# Patient Record
Sex: MALE | Race: WHITE | Employment: FULL TIME | URBAN - METROPOLITAN AREA
[De-identification: names, ages, dates, MRNs, and addresses within clinical notes are randomized per-mention and may not be internally consistent; named-entity substitution may affect disease eponyms.]

---

## 2018-11-16 ENCOUNTER — OFFICE VISIT (OUTPATIENT)
Dept: SURGERY | Facility: CLINIC | Age: 28
End: 2018-11-16
Payer: COMMERCIAL

## 2018-11-16 VITALS — WEIGHT: 196.81 LBS | BODY MASS INDEX: 27.55 KG/M2 | HEIGHT: 71 IN

## 2018-11-16 DIAGNOSIS — L72.11 PILAR AND TRICHILEMMAL CYSTS: Primary | ICD-10-CM

## 2018-11-16 DIAGNOSIS — L72.12 PILAR AND TRICHILEMMAL CYSTS: Primary | ICD-10-CM

## 2018-11-16 PROCEDURE — 99214 OFFICE O/P EST MOD 30 MIN: CPT | Performed by: SURGERY

## 2018-11-16 NOTE — CONSULTS
New Patient Consultation    Chief Complaint:  Patient presents with: Follow - Up: cyst(s) on scalp      History of Present Illness:   Tera Fountain is a 29year old male with several sebaceous cysts on his scalp and face.  They do bother him and cause occasio fever, chills, night sweats, fatigue, generalized weakness, change in appetite, weight loss, or weight gain. Endocrine:    There is no history of poor/slow wound healing, weight loss/gain, fertility or hormone problems, cold intolerance, heat intolerance, problems, trembling/tremors, fainting/black outs, dizziness, memory problems, loss of sensation/numbness, problems walking, weakness, tingling or burning in hands/feet.    Psychiatric:  There is no history of abusive relationship, bipolar disorder, sleep di minutes were spent with the patient, from which 35 minutes were spent counseling the patient and coordinating care. The different treatment options were discussed with the patient. The procedures and the postoperative care was discussed in detail.   P

## 2018-11-16 NOTE — PATIENT INSTRUCTIONS
Surgeon: Dr. Juan Lau.  Xavier Mariscal, PhD     Tel:  [phone]    Fax: [phone]     Surgery/Procedure: Excision of scalp sebaceous cysts x 7, excision of left eyebrow sebaceous cyst, complex wound closure, 1.5 hours, local anesthesia       Hospital:  [facility]

## 2018-11-16 NOTE — PROGRESS NOTES
Surgery and wash instructions verbally reviewed with the patient and written instructions were also provided. Informed consent for the procedure was reviewed and signed by the patient in the office, all questions were answered.  The patient was provided

## 2018-11-28 ENCOUNTER — TELEPHONE (OUTPATIENT)
Dept: SURGERY | Facility: CLINIC | Age: 28
End: 2018-11-28

## 2018-11-28 NOTE — TELEPHONE ENCOUNTER
S/w pt to schedule surgery. Scheduling procedure at [facility] on 1/22/19. Patient would like a call back from Dr Jarrod Ortega to discuss \"technique used during surgery. \" Offered to transfer to RN to address questions.  Patient refused and asked to talk directly wit

## 2018-11-29 NOTE — TELEPHONE ENCOUNTER
Spoke to pt and discussed using a 1mm punch biopsy for the eyebrow lesion to minimize scarring. Pt agrees and questions were answered.

## 2018-12-21 ENCOUNTER — TELEPHONE (OUTPATIENT)
Dept: SURGERY | Facility: CLINIC | Age: 28
End: 2018-12-21

## 2018-12-21 NOTE — TELEPHONE ENCOUNTER
Dr Raisa Mera is no longer available on 1/22 for surgery. She will be doing a joint procedure with another doctor. Offered to reschedule to 1/29. Patient is ok with this.

## 2019-01-29 ENCOUNTER — HOSPITAL ENCOUNTER (OUTPATIENT)
Facility: HOSPITAL | Age: 29
Setting detail: HOSPITAL OUTPATIENT SURGERY
Discharge: HOME | End: 2019-01-29
Attending: SURGERY | Admitting: SURGERY
Payer: COMMERCIAL

## 2019-01-29 VITALS
RESPIRATION RATE: 18 BRPM | DIASTOLIC BLOOD PRESSURE: 74 MMHG | SYSTOLIC BLOOD PRESSURE: 120 MMHG | TEMPERATURE: 98 F | BODY MASS INDEX: 26.18 KG/M2 | HEART RATE: 64 BPM | OXYGEN SATURATION: 100 % | HEIGHT: 71 IN | WEIGHT: 187 LBS

## 2019-01-29 DIAGNOSIS — L72.12 PILAR AND TRICHILEMMAL CYSTS: ICD-10-CM

## 2019-01-29 DIAGNOSIS — L72.11 PILAR AND TRICHILEMMAL CYSTS: ICD-10-CM

## 2019-01-29 PROCEDURE — 88304 TISSUE EXAM BY PATHOLOGIST: CPT | Performed by: SURGERY

## 2019-01-29 PROCEDURE — 0JB00ZZ EXCISION OF SCALP SUBCUTANEOUS TISSUE AND FASCIA, OPEN APPROACH: ICD-10-PCS | Performed by: SURGERY

## 2019-01-29 RX ORDER — LIDOCAINE HYDROCHLORIDE AND EPINEPHRINE 10; 10 MG/ML; UG/ML
INJECTION, SOLUTION INFILTRATION; PERINEURAL AS NEEDED
Status: DISCONTINUED | OUTPATIENT
Start: 2019-01-29 | End: 2019-01-29 | Stop reason: HOSPADM

## 2019-01-29 NOTE — BRIEF OP NOTE
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

## 2019-01-31 NOTE — OPERATIVE REPORT
[address]    PATIENT'S NAME: MARIANNA CARRILLO   ATTENDING PHYSICIAN: Kiersten Allen MD   OPERATING PHYSICIAN: Kiersten Allen MD   PATIENT ACCOUNT#:   [id number]    LOCATION:  [address] 3 EDW 10  MEDICAL RECORD #:   [id number]       DATE OF BIRT locations. All 6 cysts were sent together as they appeared clinically identical.  Hemostasis was achieved and all 6 incisions were closed with complex layered closure with 4-0 deep Vicryl sutures and 4-0 Prolene skin sutures.   Bacitracin ointment was appl

## 2019-02-08 ENCOUNTER — OFFICE VISIT (OUTPATIENT)
Dept: SURGERY | Facility: CLINIC | Age: 29
End: 2019-02-08
Payer: COMMERCIAL

## 2019-02-08 DIAGNOSIS — L72.12 PILAR AND TRICHILEMMAL CYSTS: Primary | ICD-10-CM

## 2019-02-08 DIAGNOSIS — L72.11 PILAR AND TRICHILEMMAL CYSTS: Primary | ICD-10-CM

## 2019-02-08 PROCEDURE — 99024 POSTOP FOLLOW-UP VISIT: CPT | Performed by: SURGERY

## 2019-02-08 NOTE — PROGRESS NOTES
Anish Pepper is a 29year old male who presents today for a follow-up after scalp pilar cyst excision x 6  He denies fever and chills. He denies nausea, vomiting, diarrhea or constipation. His pain is controlled.         Physical Exam     Surgical incisions

## 2021-02-22 ENCOUNTER — TELEPHONE (OUTPATIENT)
Dept: SURGERY | Facility: CLINIC | Age: 31
End: 2021-02-22

## 2021-02-22 NOTE — TELEPHONE ENCOUNTER
Called pt to let him know that his insurance will not authorize the in office procedure with Dr. Malinda Canales as he has not had a follow up visit with her since 2019.  Pt verbalized understanding and wanted the appointment to just be a regular visit anyway and hav

## 2021-03-26 ENCOUNTER — OFFICE VISIT (OUTPATIENT)
Dept: SURGERY | Facility: CLINIC | Age: 31
End: 2021-03-26
Payer: COMMERCIAL

## 2021-03-26 DIAGNOSIS — L72.12 PILAR AND TRICHILEMMAL CYSTS: Primary | ICD-10-CM

## 2021-03-26 DIAGNOSIS — L72.11 PILAR AND TRICHILEMMAL CYSTS: Primary | ICD-10-CM

## 2021-03-26 PROCEDURE — 99213 OFFICE O/P EST LOW 20 MIN: CPT | Performed by: SURGERY

## 2021-03-26 NOTE — CONSULTS
Estabilshed Patient Consultation    Chief Complaint: scalp pilar cysts  History of Present Illness:   Callie Otero is a 32year old male who returns to the office with 7 new pilar cysts on his scalp. They have enlarged in size and are bothering him.  He is he deformities        Impression:   Marci Lopes  is a 32year old male with 7 new sebaceous cysts on his scalp        Discussion and Plan:  The patient was counseled on the different treatment options. We discussed excision under local anesthesia.  This nee

## 2021-03-26 NOTE — PATIENT INSTRUCTIONS
Surgeon:              Dr. Sabrina Harper, PhD                                          Tel:         [phone]                                  Fax:        [phone]    Surgery/Procedure:        Excision of sebaceous cyst scalp x 7  Local anesthesia, o

## 2021-03-26 NOTE — H&P (VIEW-ONLY)
Estabilshed Patient Consultation    Chief Complaint: scalp pilar cysts  History of Present Illness:   Tera Fountain is a 32year old male who returns to the office with 7 new pilar cysts on his scalp. They have enlarged in size and are bothering him.  He is he deformities        Impression:   Vero Michel  is a 32year old male with 7 new sebaceous cysts on his scalp        Discussion and Plan:  The patient was counseled on the different treatment options. We discussed excision under local anesthesia.  This nee

## 2021-03-29 ENCOUNTER — TELEPHONE (OUTPATIENT)
Dept: SURGERY | Facility: CLINIC | Age: 31
End: 2021-03-29

## 2021-03-29 DIAGNOSIS — L72.12 PILAR AND TRICHILEMMAL CYSTS: Primary | ICD-10-CM

## 2021-03-29 DIAGNOSIS — L72.11 PILAR AND TRICHILEMMAL CYSTS: Primary | ICD-10-CM

## 2021-03-29 RX ORDER — DEXTROAMPHETAMINE SACCHARATE, AMPHETAMINE ASPARTATE MONOHYDRATE, DEXTROAMPHETAMINE SULFATE AND AMPHETAMINE SULFATE 1.25; 1.25; 1.25; 1.25 MG/1; MG/1; MG/1; MG/1
5 CAPSULE, EXTENDED RELEASE ORAL EVERY MORNING
COMMUNITY

## 2021-03-29 NOTE — TELEPHONE ENCOUNTER
I called the patient and scheduled his surgery with Dr Brissa Allen on 04/08/2021 at [facility].  Confirmed date and location

## 2021-04-08 ENCOUNTER — HOSPITAL ENCOUNTER (OUTPATIENT)
Facility: HOSPITAL | Age: 31
Setting detail: HOSPITAL OUTPATIENT SURGERY
Discharge: HOME | End: 2021-04-08
Attending: SURGERY | Admitting: SURGERY
Payer: COMMERCIAL

## 2021-04-08 VITALS
WEIGHT: 199.06 LBS | BODY MASS INDEX: 27.87 KG/M2 | HEIGHT: 71 IN | DIASTOLIC BLOOD PRESSURE: 74 MMHG | SYSTOLIC BLOOD PRESSURE: 130 MMHG | OXYGEN SATURATION: 100 % | HEART RATE: 67 BPM | RESPIRATION RATE: 18 BRPM | TEMPERATURE: 98 F

## 2021-04-08 DIAGNOSIS — L72.12 PILAR AND TRICHILEMMAL CYSTS: ICD-10-CM

## 2021-04-08 DIAGNOSIS — L72.11 PILAR AND TRICHILEMMAL CYSTS: ICD-10-CM

## 2021-04-08 PROCEDURE — 88304 TISSUE EXAM BY PATHOLOGIST: CPT | Performed by: SURGERY

## 2021-04-08 PROCEDURE — 0HB0XZZ EXCISION OF SCALP SKIN, EXTERNAL APPROACH: ICD-10-PCS | Performed by: SURGERY

## 2021-04-08 RX ORDER — LIDOCAINE HYDROCHLORIDE AND EPINEPHRINE 10; 10 MG/ML; UG/ML
INJECTION, SOLUTION INFILTRATION; PERINEURAL AS NEEDED
Status: DISCONTINUED | OUTPATIENT
Start: 2021-04-08 | End: 2021-04-08 | Stop reason: HOSPADM

## 2021-04-08 NOTE — BRIEF OP NOTE
Pre-Operative Diagnosis: Pilar and trichilemmal cysts X7 [L72.11, L72.12]     Post-Operative Diagnosis: Pilar and trichilemmal cysts X7 [L72.11, L72.12]      Procedure Performed:   Excision of sebaceous cyst scalp times 7    Surgeon(s) and Role:     * Seit

## 2021-04-09 NOTE — OPERATIVE REPORT
[facility]    PATIENT'S NAME: GERARDO MARIANNA NAVARRON   ATTENDING PHYSICIAN: Kiersten Mariscal MD   OPERATING PHYSICIAN: Kiersten Mariscal MD   PATIENT ACCOUNT#:   [id number]    LOCATION:  [address] [place] [address] 3 EDWP 10  MEDICAL RECORD #:   [id number]       DATE after the local anesthesia took its effect, a 15 blade was used and all 7 lesions were excised. After making an incision through the scalp, a Clayton nerve dissector was used to dissect out the sebaceous cysts, including the capsule.   All 7 cysts were exci

## 2021-04-21 ENCOUNTER — OFFICE VISIT (OUTPATIENT)
Dept: SURGERY | Facility: CLINIC | Age: 31
End: 2021-04-21
Payer: COMMERCIAL

## 2021-04-21 DIAGNOSIS — L72.12 PILAR AND TRICHILEMMAL CYSTS: Primary | ICD-10-CM

## 2021-04-21 DIAGNOSIS — L72.11 PILAR AND TRICHILEMMAL CYSTS: Primary | ICD-10-CM

## 2021-04-21 PROCEDURE — 99024 POSTOP FOLLOW-UP VISIT: CPT | Performed by: PHYSICIAN ASSISTANT

## 2021-04-21 NOTE — PROGRESS NOTES
Baljinder Arnold is a 32year old male who presents today for a follow-up after excision of sebaceous cyst scalp x7, total excised area 6 cm with complex layered wound closure scalp wounds, totaling 8 cm on 4/8/2021 with Dr. Briseida Perry.  He denies fever and ch

## 2021-04-30 ENCOUNTER — OFFICE VISIT (OUTPATIENT)
Dept: SURGERY | Facility: CLINIC | Age: 31
End: 2021-04-30
Payer: COMMERCIAL

## 2021-04-30 DIAGNOSIS — L72.12 PILAR AND TRICHILEMMAL CYSTS: Primary | ICD-10-CM

## 2021-04-30 DIAGNOSIS — L72.11 PILAR AND TRICHILEMMAL CYSTS: Primary | ICD-10-CM

## 2021-04-30 PROCEDURE — 99024 POSTOP FOLLOW-UP VISIT: CPT | Performed by: SURGERY

## 2021-04-30 NOTE — PROGRESS NOTES
Carlitos Rosario is a 32year old male who presents today for a follow-up after sebaceous cyst removal scalp  He denies fever and chills. He denies nausea, vomiting, diarrhea or constipation. His pain is controlled.         Physical Exam     Surgical in

## (undated) DEVICE — HEAD AND NECK CDS-LF: Brand: MEDLINE INDUSTRIES, INC.

## (undated) DEVICE — GAMMEX® PI HYBRID SIZE 7, STERILE POWDER-FREE SURGICAL GLOVE, POLYISOPRENE AND NEOPRENE BLEND: Brand: GAMMEX

## (undated) DEVICE — PREP BETADINE SOL 5% EYE

## (undated) DEVICE — SUTURE PROLENE 4-0 RB-1

## (undated) DEVICE — STANDARD HYPODERMIC NEEDLE,POLYPROPYLENE HUB: Brand: MONOJECT

## (undated) DEVICE — STERILE POLYISOPRENE POWDER-FREE SURGICAL GLOVES: Brand: PROTEXIS

## (undated) DEVICE — SHEET, DRAPE, SPLIT, STERILE: Brand: MEDLINE

## (undated) DEVICE — SUTURE PROLENE 4-0 PS-2

## (undated) DEVICE — SUTURE PROLENE 5-0 RB-1

## (undated) DEVICE — KENDALL SCD EXPRESS SLEEVES, KNEE LENGTH, MEDIUM: Brand: KENDALL SCD

## (undated) DEVICE — REM POLYHESIVE ADULT PATIENT RETURN ELECTRODE: Brand: VALLEYLAB

## (undated) DEVICE — CASED DISP BIPOLAR CORD

## (undated) DEVICE — SOL  .9 1000ML BTL

## (undated) DEVICE — BANDAGE ROLL,100% COTTON, 6 PLY, LARGE: Brand: KERLIX

## (undated) DEVICE — MARKER SKIN PREP RESIST STRL

## (undated) DEVICE — SUTURE VICRYL 4-0 RB-1

## (undated) DEVICE — SUTURE VICRYL 5-0 RB-1

## (undated) DEVICE — CAUTERY NEEDLE 2IN INS E1465

## (undated) DEVICE — SYRINGE 10ML LL CONTRL SYRINGE

## (undated) DEVICE — SUTURE PROLENE 3-0 V-5

## (undated) DEVICE — DRESSING TUBE GAUZE SZ 10

## (undated) DEVICE — GAMMEX® PI HYBRID SIZE 6.5, STERILE POWDER-FREE SURGICAL GLOVE, POLYISOPRENE AND NEOPRENE BLEND: Brand: GAMMEX

## (undated) DEVICE — LIGHT HANDLE

## (undated) DEVICE — CAUTERY BLADE 2IN INS E1455